# Patient Record
Sex: MALE | Race: WHITE
[De-identification: names, ages, dates, MRNs, and addresses within clinical notes are randomized per-mention and may not be internally consistent; named-entity substitution may affect disease eponyms.]

---

## 2022-11-23 ENCOUNTER — NURSE TRIAGE (OUTPATIENT)
Dept: OTHER | Facility: CLINIC | Age: 27
End: 2022-11-23

## 2022-11-23 NOTE — TELEPHONE ENCOUNTER
[place]: [place]    Received call from [facility] [place] [facility] with Red Flag Complaint. Limited triage, pt a  for occupation and not on the call. Subjective: Caller states \"For the last 2 weeks he has been having issues with staying hydrated. He cut out caffeine and sugar and been drinking water. He is still dehydrated. He is having localized left back pain. It he lays, sitting, standing it hurts. He has discoloration in his finger but his cap refill is good. \"     Current Symptoms: left flank pain. Onset: 2 weeks ago; unchanged    Associated Symptoms: frequency with urination. No burning with urination. Pain Severity: pt not present to rate      Temperature: denies     What has been tried: denies    LMP: NA Pregnant: NA    Recommended disposition: See PCP within 3 Days If unable to see a provider to go to urgent care or ED. Wife states \"He won't be back in [place] until Dec 20th or 21st\" Informed since pt out of town to call number on the back of his insurance card to find local urgent care or ED covered under his plan for treatment of flank pain. Will transfer to [facility] ([facility]) to schedule new pt appt for when he returns to [place]. Agreeable with plan. Care advice provided, patient verbalizes understanding; denies any other questions or concerns; instructed to call back for any new or worsening symptoms. Patient/Caller agrees with recommended disposition; writer provided warm transfer to [facility] at [facility] for appointment scheduling    Attention Provider: Thank you for allowing me to participate in the care of your patient. The patient was connected to triage in response to information provided to the [facility]. Please do not respond through this encounter as the response is not directed to a shared pool.     Reason for Disposition   [1] Caller is not with the adult (patient) AND [2] reporting urgent symptoms   MILD pain (i.e., scale 1-3; does not interfere with normal activities) and present > 3 days    Protocols used:  Information Only Call - No Triage-ADULT-[facility], Flank Pain-ADULT-[facility]